# Patient Record
Sex: FEMALE | Race: BLACK OR AFRICAN AMERICAN | NOT HISPANIC OR LATINO | URBAN - METROPOLITAN AREA
[De-identification: names, ages, dates, MRNs, and addresses within clinical notes are randomized per-mention and may not be internally consistent; named-entity substitution may affect disease eponyms.]

---

## 2022-11-23 ENCOUNTER — OFFICE (OUTPATIENT)
Dept: URBAN - METROPOLITAN AREA CLINIC 102 | Facility: CLINIC | Age: 62
End: 2022-11-23

## 2022-11-23 VITALS
SYSTOLIC BLOOD PRESSURE: 139 MMHG | DIASTOLIC BLOOD PRESSURE: 86 MMHG | WEIGHT: 247 LBS | HEIGHT: 69 IN | HEART RATE: 85 BPM | TEMPERATURE: 97.8 F

## 2022-11-23 DIAGNOSIS — Z12.11 ENCOUNTER FOR SCREENING FOR MALIGNANT NEOPLASM OF COLON: ICD-10-CM

## 2022-11-23 PROCEDURE — 99203 OFFICE O/P NEW LOW 30 MIN: CPT

## 2022-11-23 NOTE — SERVICEHPINOTES
63 y/o female here to discuss screening colonoscopy. Denies GI complaints. Bowel movements every 1-2 days, type 2 BSS. No hematochezia, melena, abdominal or rectal pain, tenesmus. Denies unintentional weight loss, fevers, chills, night sweats.
br --Last colonoscopy 1/2012: normal exam. R/c 10 yrs.
br br Reports having a couple episodes of SVT a few years ago. She was seen by Dr. Kirk workup unremarkable. Advised to cut back on caffeine and started on verapamil--no issues since.brNo anticoagulants.brNo sleep apnea.brNo hx of adverse reactions to anesthesia.br

## 2023-04-14 ENCOUNTER — OFFICE (OUTPATIENT)
Dept: URBAN - METROPOLITAN AREA CLINIC 34 | Facility: CLINIC | Age: 63
End: 2023-04-14

## 2023-04-14 PROCEDURE — 00031: CPT | Performed by: INTERNAL MEDICINE

## 2023-05-30 ENCOUNTER — ON CAMPUS - OUTPATIENT (OUTPATIENT)
Dept: URBAN - METROPOLITAN AREA HOSPITAL 16 | Facility: HOSPITAL | Age: 63
End: 2023-05-30
Payer: MEDICAID

## 2023-05-30 DIAGNOSIS — Z12.11 ENCOUNTER FOR SCREENING FOR MALIGNANT NEOPLASM OF COLON: ICD-10-CM

## 2023-05-30 PROCEDURE — 45378 DIAGNOSTIC COLONOSCOPY: CPT | Performed by: INTERNAL MEDICINE
